# Patient Record
Sex: FEMALE | URBAN - METROPOLITAN AREA
[De-identification: names, ages, dates, MRNs, and addresses within clinical notes are randomized per-mention and may not be internally consistent; named-entity substitution may affect disease eponyms.]

---

## 2021-03-17 ENCOUNTER — WEB ENCOUNTER (OUTPATIENT)
Dept: URBAN - METROPOLITAN AREA CLINIC 96 | Facility: CLINIC | Age: 51
End: 2021-03-17

## 2021-03-17 ENCOUNTER — DASHBOARD ENCOUNTERS (OUTPATIENT)
Age: 51
End: 2021-03-17

## 2021-03-17 ENCOUNTER — OFFICE VISIT (OUTPATIENT)
Dept: URBAN - METROPOLITAN AREA CLINIC 96 | Facility: CLINIC | Age: 51
End: 2021-03-17
Payer: COMMERCIAL

## 2021-03-17 VITALS
HEIGHT: 64 IN | TEMPERATURE: 95.6 F | DIASTOLIC BLOOD PRESSURE: 112 MMHG | HEART RATE: 88 BPM | BODY MASS INDEX: 36.88 KG/M2 | SYSTOLIC BLOOD PRESSURE: 145 MMHG | WEIGHT: 216 LBS

## 2021-03-17 DIAGNOSIS — R10.32 LLQ PAIN: ICD-10-CM

## 2021-03-17 DIAGNOSIS — K57.90 DIVERTICULOSIS: ICD-10-CM

## 2021-03-17 DIAGNOSIS — E66.9 OBESITY (BMI 30-39.9): ICD-10-CM

## 2021-03-17 PROBLEM — 162864005: Status: ACTIVE | Noted: 2021-03-17

## 2021-03-17 PROBLEM — 307496006: Status: ACTIVE | Noted: 2021-03-17

## 2021-03-17 PROCEDURE — 99204 OFFICE O/P NEW MOD 45 MIN: CPT | Performed by: INTERNAL MEDICINE

## 2021-03-17 RX ORDER — SODIUM, POTASSIUM,MAG SULFATES 17.5-3.13G
354ML SOLUTION, RECONSTITUTED, ORAL ORAL
Qty: 354 MILLILITER | Refills: 0 | OUTPATIENT
Start: 2021-03-17 | End: 2021-03-18

## 2021-03-17 NOTE — HPI-OTHER HISTORIES
Shakira was seen in [facility] 1 week ago with LLQ pain. CT 3/8/2021 without contrast with no evidence of bowel obstruction or dilatation. No inflammatory changes. Mild diverticulosis. Left 2 mm intrarenal calculus, mild prominence of left renal collecting system and left ureter. Could indicate recently passed calculucs or stable chronic changes. (see chart for reviewed records)  Similar to prior episode 1 year ago and thought was kidney stone. Had f/c. No rectal bleeding, no melena, no unintentional weignt loss, no n/v.  Patient was told she may have diverticulitis and was treated with antibiotics. Now pain is much improved.   Feels this was her second episode of diverticulitis.   No family hx of colon cancer or colon polyps. No IBD.  Patient reports feels improved. Remote colonoscopy 8 years ago normal per patient, had hemorrhoid surgery. No change in bowel movement.  Patient reports newly diagnosed diabetes, awaiting primary MD evaluation.

## 2021-03-17 NOTE — PHYSICAL EXAM GASTROINTESTINAL
Abdomen , obese, minimally tender in LLQ with no guarding or rigidity , no masses palpable , normal bowel sounds

## 2021-05-14 ENCOUNTER — WEB ENCOUNTER (OUTPATIENT)
Dept: URBAN - METROPOLITAN AREA CLINIC 96 | Facility: CLINIC | Age: 51
End: 2021-05-14

## 2021-05-28 ENCOUNTER — OFFICE VISIT (OUTPATIENT)
Dept: URBAN - METROPOLITAN AREA SURGERY CENTER 18 | Facility: SURGERY CENTER | Age: 51
End: 2021-05-28

## 2021-06-09 PROBLEM — 397881000: Status: ACTIVE | Noted: 2021-03-17

## 2021-08-06 ENCOUNTER — OFFICE VISIT (OUTPATIENT)
Dept: URBAN - METROPOLITAN AREA SURGERY CENTER 18 | Facility: SURGERY CENTER | Age: 51
End: 2021-08-06

## 2024-09-16 ENCOUNTER — WEB ENCOUNTER (OUTPATIENT)
Dept: URBAN - METROPOLITAN AREA CLINIC 92 | Facility: CLINIC | Age: 54
End: 2024-09-16

## 2024-09-18 ENCOUNTER — OFFICE VISIT (OUTPATIENT)
Dept: URBAN - METROPOLITAN AREA CLINIC 92 | Facility: CLINIC | Age: 54
End: 2024-09-18
Payer: COMMERCIAL

## 2024-09-18 ENCOUNTER — LAB OUTSIDE AN ENCOUNTER (OUTPATIENT)
Dept: URBAN - METROPOLITAN AREA CLINIC 92 | Facility: CLINIC | Age: 54
End: 2024-09-18

## 2024-09-18 VITALS
HEART RATE: 75 BPM | HEIGHT: 64 IN | SYSTOLIC BLOOD PRESSURE: 118 MMHG | BODY MASS INDEX: 36.47 KG/M2 | WEIGHT: 213.6 LBS | DIASTOLIC BLOOD PRESSURE: 81 MMHG | TEMPERATURE: 96.4 F

## 2024-09-18 DIAGNOSIS — Z12.11 COLON CANCER SCREENING: ICD-10-CM

## 2024-09-18 DIAGNOSIS — Z87.19 HISTORY OF DIVERTICULITIS: ICD-10-CM

## 2024-09-18 PROCEDURE — 99203 OFFICE O/P NEW LOW 30 MIN: CPT

## 2024-09-18 RX ORDER — BUPROPION HYDROCHLORIDE 300 MG/1
1 TABLET IN THE MORNING TABLET ORAL ONCE A DAY
Status: ACTIVE | COMMUNITY

## 2024-09-18 RX ORDER — ROSUVASTATIN CALCIUM 20 MG/1
1 TABLET TABLET, COATED ORAL ONCE A DAY
Status: ACTIVE | COMMUNITY

## 2024-09-18 RX ORDER — IPRATROPIUM BROMIDE 42 UG/1
4 SPRAYS 2 SPRAYS IN EACH NOSTRIL SPRAY NASAL
Status: ACTIVE | COMMUNITY

## 2024-09-18 RX ORDER — TINIDAZOLE 500 MG/1
4 TABLETS WITH FOOD TABLET ORAL ONCE A DAY
Status: ACTIVE | COMMUNITY

## 2024-09-18 NOTE — HPI-TODAY'S VISIT:
This is a 54 year old female with a PMH of diabetes, HLD, nephrolithiasis, anxiety and prior diverticulitis. She presents today to discuss a screening colonoscopy and recent flare of diverticulitis.  Her first episode occured in 2020 when she experienced a sudden onset of LLQ pain. She went to the ER at [facility] where she was also diagnosed with diabetes. For a while she had been on Ozempic with good results, however she has been off the medication for several months after she started to experience constipation and epigastric pain. This had resolved after stopping the medication. Towards the end of August this year, she was again diagnosed with diverticulitis and went to [facility] where she was given a 10 day course of Bactrim and Flagyl. She now reports no abdominal pain and denies fevers/chills. She has also started taking a daily fiber supplement and has regular, smooth BMs. She states this was recommended to her by the PA she saw at [facility] as well as coming to the GI for a colonoscopy. She denies rectal bleeding or unexpected weight loss. To her knowledge, there is no family history of GI malignancy. She herself has never had a screening colonoscopy. Of note, she did see Dr. Tong in 2021 who had scheduled her for a colonoscopy, but the procedure was never done. Patient said she had put off a lot of medical procedures/appointments during COVID

## 2024-11-11 ENCOUNTER — OFFICE VISIT (OUTPATIENT)
Dept: URBAN - METROPOLITAN AREA SURGERY CENTER 16 | Facility: SURGERY CENTER | Age: 54
End: 2024-11-11

## 2024-12-03 ENCOUNTER — OFFICE VISIT (OUTPATIENT)
Dept: URBAN - METROPOLITAN AREA CLINIC 92 | Facility: CLINIC | Age: 54
End: 2024-12-03

## 2024-12-10 ENCOUNTER — CLAIMS CREATED FROM THE CLAIM WINDOW (OUTPATIENT)
Dept: URBAN - METROPOLITAN AREA CLINIC 4 | Facility: CLINIC | Age: 54
End: 2024-12-10
Payer: COMMERCIAL

## 2024-12-10 ENCOUNTER — OFFICE VISIT (OUTPATIENT)
Dept: URBAN - METROPOLITAN AREA SURGERY CENTER 16 | Facility: SURGERY CENTER | Age: 54
End: 2024-12-10
Payer: COMMERCIAL

## 2024-12-10 DIAGNOSIS — D12.3 ADENOMA OF TRANSVERSE COLON: ICD-10-CM

## 2024-12-10 DIAGNOSIS — Z12.11 COLON CANCER SCREENING: ICD-10-CM

## 2024-12-10 DIAGNOSIS — K57.30 DIVERTICULA, COLON: ICD-10-CM

## 2024-12-10 DIAGNOSIS — D12.3 BENIGN NEOPLASM OF HEPATIC FLEXURE OF COLON: ICD-10-CM

## 2024-12-10 DIAGNOSIS — K64.4 RESIDUAL HEMORRHOIDAL SKIN TAGS: ICD-10-CM

## 2024-12-10 DIAGNOSIS — D12.3 BENIGN NEOPLASM OF TRANSVERSE COLON: ICD-10-CM

## 2024-12-10 PROCEDURE — 00812 ANES LWR INTST SCR COLSC: CPT | Performed by: ANESTHESIOLOGY

## 2024-12-10 PROCEDURE — 88305 TISSUE EXAM BY PATHOLOGIST: CPT | Performed by: PATHOLOGY

## 2024-12-10 PROCEDURE — 45380 COLONOSCOPY AND BIOPSY: CPT | Performed by: INTERNAL MEDICINE

## 2024-12-10 PROCEDURE — 00812 ANES LWR INTST SCR COLSC: CPT | Performed by: ANESTHESIOLOGIST ASSISTANT

## 2024-12-10 RX ORDER — ROSUVASTATIN CALCIUM 20 MG/1
1 TABLET TABLET, COATED ORAL ONCE A DAY
Status: ACTIVE | COMMUNITY

## 2024-12-10 RX ORDER — IPRATROPIUM BROMIDE 42 UG/1
4 SPRAYS 2 SPRAYS IN EACH NOSTRIL SPRAY NASAL
Status: ACTIVE | COMMUNITY

## 2024-12-10 RX ORDER — BUPROPION HYDROCHLORIDE 300 MG/1
1 TABLET IN THE MORNING TABLET ORAL ONCE A DAY
Status: ACTIVE | COMMUNITY

## 2024-12-10 RX ORDER — TINIDAZOLE 500 MG/1
4 TABLETS WITH FOOD TABLET ORAL ONCE A DAY
Status: ACTIVE | COMMUNITY

## 2025-01-07 ENCOUNTER — OFFICE VISIT (OUTPATIENT)
Dept: URBAN - METROPOLITAN AREA CLINIC 92 | Facility: CLINIC | Age: 55
End: 2025-01-07
Payer: COMMERCIAL

## 2025-01-07 VITALS
BODY MASS INDEX: 35.51 KG/M2 | DIASTOLIC BLOOD PRESSURE: 81 MMHG | SYSTOLIC BLOOD PRESSURE: 130 MMHG | TEMPERATURE: 96.6 F | HEIGHT: 64 IN | WEIGHT: 208 LBS | HEART RATE: 69 BPM

## 2025-01-07 DIAGNOSIS — Z86.0100 PERSONAL HISTORY OF COLON POLYPS, UNSPECIFIED: ICD-10-CM

## 2025-01-07 DIAGNOSIS — Z87.19 HISTORY OF DIVERTICULITIS: ICD-10-CM

## 2025-01-07 PROCEDURE — 99213 OFFICE O/P EST LOW 20 MIN: CPT

## 2025-01-07 RX ORDER — IPRATROPIUM BROMIDE 42 UG/1
4 SPRAYS 2 SPRAYS IN EACH NOSTRIL SPRAY NASAL
Status: ACTIVE | COMMUNITY

## 2025-01-07 RX ORDER — TINIDAZOLE 500 MG/1
4 TABLETS WITH FOOD TABLET ORAL ONCE A DAY
Status: ON HOLD | COMMUNITY

## 2025-01-07 RX ORDER — BUPROPION HYDROCHLORIDE 300 MG/1
1 TABLET IN THE MORNING TABLET ORAL ONCE A DAY
Status: ACTIVE | COMMUNITY

## 2025-01-07 RX ORDER — ROSUVASTATIN CALCIUM 20 MG/1
1 TABLET TABLET, COATED ORAL ONCE A DAY
Status: ACTIVE | COMMUNITY

## 2025-01-07 RX ORDER — TIRZEPATIDE 5 MG/.5ML
AS DIRECTED INJECTION, SOLUTION SUBCUTANEOUS
Status: ACTIVE | COMMUNITY

## 2025-01-07 NOTE — HPI-TODAY'S VISIT:
This is a 54 year old female with a PMH of diabetes, HLD, nephrolithiasis, anxiety and prior diverticulitis. She presents today in follow up from her colonoscopy.   9/18/24, Her first episode occured in 2020 when she experienced a sudden onset of LLQ pain. She went to the ER at [facility] where she was also diagnosed with diabetes. For a while she had been on Ozempic with good results, however she has been off the medication for several months after she started to experience constipation and epigastric pain. This had resolved after stopping the medication. Towards the end of August this year, she was again diagnosed with diverticulitis and went to [facility] where she was given a 10 day course of Bactrim and Flagyl. She now reports no abdominal pain and denies fevers/chills. She has also started taking a daily fiber supplement and has regular, smooth BMs. She states this was recommended to her by the PA she saw at [facility] as well as coming to the GI for a colonoscopy. She denies rectal bleeding or unexpected weight loss. To her knowledge, there is no family history of GI malignancy. She herself has never had a screening colonoscopy. Of note, she did see Dr. Tong in 2021 who had scheduled her for a colonoscopy, but the procedure was never done. Patient said she had put off a lot of medical procedures/appointments during COVID.  Colonoscopy 12/10/24 with Dr. Chambers revealed perianal skin tags, diverticulosis in right and left colonm one diminutive, TA polyp at hepatic flexure, IH, 5 yr repeat. Denies fmhx of GI cancers.   Today, patient reports she feels well. Denies abdominal pain, diarrhea, constipation, hematochezia, or melena. Denies upper GI issues. She started Mounjaro late September. Lost 7-8 lbs. Last A1C under 7. She takes a daily fiber supplement and is able to have a daily BM.

## 2025-04-10 ENCOUNTER — APPOINTMENT (OUTPATIENT)
Dept: URBAN - METROPOLITAN AREA CLINIC 206 | Age: 55
Setting detail: DERMATOLOGY
End: 2025-04-11

## 2025-04-10 DIAGNOSIS — L71.8 OTHER ROSACEA: ICD-10-CM

## 2025-04-10 PROCEDURE — OTHER PRESCRIPTION MEDICATION MANAGEMENT: OTHER

## 2025-04-10 PROCEDURE — OTHER MIPS QUALITY: OTHER

## 2025-04-10 PROCEDURE — OTHER PRESCRIPTION: OTHER

## 2025-04-10 PROCEDURE — OTHER ADDITIONAL NOTES: OTHER

## 2025-04-10 PROCEDURE — OTHER COUNSELING: OTHER

## 2025-04-10 RX ORDER — DOXYCYCLINE HYCLATE 100 MG/1
CAPSULE, GELATIN COATED ORAL QD
Qty: 30 | Refills: 3 | Status: ERX | COMMUNITY
Start: 2025-04-10

## 2025-04-10 ASSESSMENT — SEVERITY ASSESSMENT OVERALL AMONG ALL PATIENTS
IN YOUR EXPERIENCE, AMONG ALL PATIENTS YOU HAVE SEEN WITH THIS CONDITION, HOW SEVERE IS THIS PATIENT'S CONDITION?: MODERATE

## 2025-04-10 ASSESSMENT — LOCATION SIMPLE DESCRIPTION DERM
LOCATION SIMPLE: RIGHT CHEEK
LOCATION SIMPLE: LEFT CHEEK

## 2025-04-10 ASSESSMENT — LOCATION DETAILED DESCRIPTION DERM
LOCATION DETAILED: LEFT SUPERIOR CENTRAL MALAR CHEEK
LOCATION DETAILED: RIGHT SUPERIOR CENTRAL MALAR CHEEK

## 2025-04-10 ASSESSMENT — LOCATION ZONE DERM: LOCATION ZONE: FACE

## 2025-04-10 NOTE — PROCEDURE: ADDITIONAL NOTES
Additional Notes: Recommendations:\\n-Seeing an eye doctor for drops and ocular care.
Render Risk Assessment In Note?: no
Detail Level: Simple

## 2025-04-10 NOTE — HPI: RASH (ROSACEA)
How Severe Is Your Rosacea?: moderate
Is This A New Presentation, Or A Follow-Up?: Rash
Additional History: Patient states that she has occular rosacea. She states that her eyes are burning and it’s causing dark red spots around the eyes. She also says that it can be very itchy at times. She was using a metronidazole cream that works for her, she was hoping to get something for her rosacea.

## 2025-04-10 NOTE — PROCEDURE: PRESCRIPTION MEDICATION MANAGEMENT
Initiate Treatment: -doxycycline hyclate 100 mg capsule QD Take 1 pill once daily with food and water. Don't lay down for 30 mins.
Modify Regimen: - 15% Azelaic acid/ Metronidazole1.2%/ Ivermectin 1% (sent to integrity )
Render In Strict Bullet Format?: No
Detail Level: Zone
Plan: If patient's rosacea is under control, consider decreasing doxy to 50mg at follow up.